# Patient Record
Sex: FEMALE | Race: WHITE | NOT HISPANIC OR LATINO | Employment: PART TIME | URBAN - METROPOLITAN AREA
[De-identification: names, ages, dates, MRNs, and addresses within clinical notes are randomized per-mention and may not be internally consistent; named-entity substitution may affect disease eponyms.]

---

## 2018-12-22 ENCOUNTER — APPOINTMENT (OUTPATIENT)
Dept: RADIOLOGY | Facility: MEDICAL CENTER | Age: 33
End: 2018-12-22
Attending: EMERGENCY MEDICINE
Payer: COMMERCIAL

## 2018-12-22 ENCOUNTER — HOSPITAL ENCOUNTER (EMERGENCY)
Facility: MEDICAL CENTER | Age: 33
End: 2018-12-22
Attending: EMERGENCY MEDICINE
Payer: COMMERCIAL

## 2018-12-22 ENCOUNTER — HOSPITAL ENCOUNTER (OUTPATIENT)
Dept: RADIOLOGY | Facility: MEDICAL CENTER | Age: 33
End: 2018-12-22
Attending: PHYSICIAN ASSISTANT
Payer: COMMERCIAL

## 2018-12-22 ENCOUNTER — OFFICE VISIT (OUTPATIENT)
Dept: URGENT CARE | Facility: PHYSICIAN GROUP | Age: 33
End: 2018-12-22
Payer: COMMERCIAL

## 2018-12-22 VITALS
HEART RATE: 79 BPM | HEIGHT: 66 IN | BODY MASS INDEX: 32.99 KG/M2 | DIASTOLIC BLOOD PRESSURE: 83 MMHG | TEMPERATURE: 97.3 F | OXYGEN SATURATION: 97 % | SYSTOLIC BLOOD PRESSURE: 150 MMHG | RESPIRATION RATE: 20 BRPM | WEIGHT: 205.25 LBS

## 2018-12-22 VITALS
HEIGHT: 66 IN | SYSTOLIC BLOOD PRESSURE: 126 MMHG | WEIGHT: 205 LBS | HEART RATE: 121 BPM | DIASTOLIC BLOOD PRESSURE: 80 MMHG | TEMPERATURE: 99 F | BODY MASS INDEX: 32.95 KG/M2 | OXYGEN SATURATION: 91 %

## 2018-12-22 DIAGNOSIS — R52 BODY ACHES: ICD-10-CM

## 2018-12-22 DIAGNOSIS — R11.0 NAUSEA: ICD-10-CM

## 2018-12-22 DIAGNOSIS — R10.84 GENERALIZED ABDOMINAL PAIN: ICD-10-CM

## 2018-12-22 DIAGNOSIS — R06.02 SOB (SHORTNESS OF BREATH): ICD-10-CM

## 2018-12-22 DIAGNOSIS — R11.2 NAUSEA AND VOMITING, INTRACTABILITY OF VOMITING NOT SPECIFIED, UNSPECIFIED VOMITING TYPE: ICD-10-CM

## 2018-12-22 LAB
ALBUMIN SERPL BCP-MCNC: 4.6 G/DL (ref 3.2–4.9)
ALBUMIN/GLOB SERPL: 1.6 G/DL
ALP SERPL-CCNC: 54 U/L (ref 30–99)
ALT SERPL-CCNC: 15 U/L (ref 2–50)
ANION GAP SERPL CALC-SCNC: 13 MMOL/L (ref 0–11.9)
APPEARANCE UR: CLEAR
AST SERPL-CCNC: 17 U/L (ref 12–45)
BASOPHILS # BLD AUTO: 0.7 % (ref 0–1.8)
BASOPHILS # BLD: 0.06 K/UL (ref 0–0.12)
BILIRUB SERPL-MCNC: 0.5 MG/DL (ref 0.1–1.5)
BILIRUB UR STRIP-MCNC: NEGATIVE MG/DL
BUN SERPL-MCNC: 16 MG/DL (ref 8–22)
CALCIUM SERPL-MCNC: 9.5 MG/DL (ref 8.5–10.5)
CHLORIDE SERPL-SCNC: 109 MMOL/L (ref 96–112)
CO2 SERPL-SCNC: 19 MMOL/L (ref 20–33)
COLOR UR AUTO: YELLOW
CREAT SERPL-MCNC: 1.25 MG/DL (ref 0.5–1.4)
EOSINOPHIL # BLD AUTO: 0.15 K/UL (ref 0–0.51)
EOSINOPHIL NFR BLD: 1.7 % (ref 0–6.9)
ERYTHROCYTE [DISTWIDTH] IN BLOOD BY AUTOMATED COUNT: 44.2 FL (ref 35.9–50)
FLUAV+FLUBV AG SPEC QL IA: NEGATIVE
GLOBULIN SER CALC-MCNC: 2.8 G/DL (ref 1.9–3.5)
GLUCOSE SERPL-MCNC: 105 MG/DL (ref 65–99)
GLUCOSE UR STRIP.AUTO-MCNC: NEGATIVE MG/DL
HCG SERPL QL: NEGATIVE
HCT VFR BLD AUTO: 42.7 % (ref 37–47)
HGB BLD-MCNC: 14.9 G/DL (ref 12–16)
IMM GRANULOCYTES # BLD AUTO: 0.02 K/UL (ref 0–0.11)
IMM GRANULOCYTES NFR BLD AUTO: 0.2 % (ref 0–0.9)
INT CON NEG: NEGATIVE
INT CON NEG: NEGATIVE
INT CON POS: POSITIVE
INT CON POS: POSITIVE
KETONES UR STRIP.AUTO-MCNC: NEGATIVE MG/DL
LACTATE BLD-SCNC: 1.6 MMOL/L (ref 0.5–2)
LEUKOCYTE ESTERASE UR QL STRIP.AUTO: NEGATIVE
LIPASE SERPL-CCNC: 16 U/L (ref 11–82)
LYMPHOCYTES # BLD AUTO: 2.36 K/UL (ref 1–4.8)
LYMPHOCYTES NFR BLD: 26.9 % (ref 22–41)
MCH RBC QN AUTO: 34.3 PG (ref 27–33)
MCHC RBC AUTO-ENTMCNC: 34.9 G/DL (ref 33.6–35)
MCV RBC AUTO: 98.4 FL (ref 81.4–97.8)
MONOCYTES # BLD AUTO: 0.77 K/UL (ref 0–0.85)
MONOCYTES NFR BLD AUTO: 8.8 % (ref 0–13.4)
NEUTROPHILS # BLD AUTO: 5.4 K/UL (ref 2–7.15)
NEUTROPHILS NFR BLD: 61.7 % (ref 44–72)
NITRITE UR QL STRIP.AUTO: NORMAL
NRBC # BLD AUTO: 0 K/UL
NRBC BLD-RTO: 0 /100 WBC
PH UR STRIP.AUTO: 7 [PH] (ref 5–8)
PLATELET # BLD AUTO: 278 K/UL (ref 164–446)
PMV BLD AUTO: 9.4 FL (ref 9–12.9)
POTASSIUM SERPL-SCNC: 3.8 MMOL/L (ref 3.6–5.5)
PROT SERPL-MCNC: 7.4 G/DL (ref 6–8.2)
PROT UR QL STRIP: NEGATIVE MG/DL
RBC # BLD AUTO: 4.34 M/UL (ref 4.2–5.4)
RBC UR QL AUTO: NEGATIVE
S PYO AG THROAT QL: NORMAL
SODIUM SERPL-SCNC: 141 MMOL/L (ref 135–145)
SP GR UR STRIP.AUTO: 1.01
TROPONIN I SERPL-MCNC: <0.01 NG/ML (ref 0–0.04)
UROBILINOGEN UR STRIP-MCNC: NEGATIVE MG/DL
WBC # BLD AUTO: 8.8 K/UL (ref 4.8–10.8)

## 2018-12-22 PROCEDURE — 87804 INFLUENZA ASSAY W/OPTIC: CPT | Performed by: PHYSICIAN ASSISTANT

## 2018-12-22 PROCEDURE — 76705 ECHO EXAM OF ABDOMEN: CPT

## 2018-12-22 PROCEDURE — 83690 ASSAY OF LIPASE: CPT

## 2018-12-22 PROCEDURE — 700102 HCHG RX REV CODE 250 W/ 637 OVERRIDE(OP): Performed by: EMERGENCY MEDICINE

## 2018-12-22 PROCEDURE — 93005 ELECTROCARDIOGRAM TRACING: CPT | Performed by: EMERGENCY MEDICINE

## 2018-12-22 PROCEDURE — 81002 URINALYSIS NONAUTO W/O SCOPE: CPT | Performed by: PHYSICIAN ASSISTANT

## 2018-12-22 PROCEDURE — 99214 OFFICE O/P EST MOD 30 MIN: CPT | Performed by: PHYSICIAN ASSISTANT

## 2018-12-22 PROCEDURE — 84484 ASSAY OF TROPONIN QUANT: CPT

## 2018-12-22 PROCEDURE — 84703 CHORIONIC GONADOTROPIN ASSAY: CPT

## 2018-12-22 PROCEDURE — 87880 STREP A ASSAY W/OPTIC: CPT | Performed by: PHYSICIAN ASSISTANT

## 2018-12-22 PROCEDURE — 87040 BLOOD CULTURE FOR BACTERIA: CPT

## 2018-12-22 PROCEDURE — 700105 HCHG RX REV CODE 258: Performed by: EMERGENCY MEDICINE

## 2018-12-22 PROCEDURE — 74177 CT ABD & PELVIS W/CONTRAST: CPT

## 2018-12-22 PROCEDURE — 36415 COLL VENOUS BLD VENIPUNCTURE: CPT

## 2018-12-22 PROCEDURE — 85025 COMPLETE CBC W/AUTO DIFF WBC: CPT

## 2018-12-22 PROCEDURE — 83605 ASSAY OF LACTIC ACID: CPT

## 2018-12-22 PROCEDURE — A9270 NON-COVERED ITEM OR SERVICE: HCPCS | Performed by: EMERGENCY MEDICINE

## 2018-12-22 PROCEDURE — 80053 COMPREHEN METABOLIC PANEL: CPT

## 2018-12-22 PROCEDURE — 700117 HCHG RX CONTRAST REV CODE 255: Performed by: EMERGENCY MEDICINE

## 2018-12-22 PROCEDURE — 99285 EMERGENCY DEPT VISIT HI MDM: CPT

## 2018-12-22 PROCEDURE — 71046 X-RAY EXAM CHEST 2 VIEWS: CPT

## 2018-12-22 RX ORDER — SULFAMETHOXAZOLE AND TRIMETHOPRIM 800; 160 MG/1; MG/1
1 TABLET ORAL 2 TIMES DAILY
COMMUNITY
Start: 2018-12-21

## 2018-12-22 RX ORDER — SODIUM CHLORIDE 9 MG/ML
1000 INJECTION, SOLUTION INTRAVENOUS ONCE
Status: COMPLETED | OUTPATIENT
Start: 2018-12-22 | End: 2018-12-22

## 2018-12-22 RX ORDER — ONDANSETRON 4 MG/1
4 TABLET, ORALLY DISINTEGRATING ORAL ONCE
Qty: 10 TAB | Refills: 0 | Status: SHIPPED | OUTPATIENT
Start: 2018-12-22 | End: 2018-12-22

## 2018-12-22 RX ORDER — NAPROXEN SODIUM 220 MG
220 TABLET ORAL 2 TIMES DAILY WITH MEALS
COMMUNITY

## 2018-12-22 RX ORDER — ONDANSETRON 4 MG/1
4 TABLET, ORALLY DISINTEGRATING ORAL ONCE
Status: COMPLETED | OUTPATIENT
Start: 2018-12-22 | End: 2018-12-22

## 2018-12-22 RX ADMIN — SODIUM CHLORIDE 1000 ML: 9 INJECTION, SOLUTION INTRAVENOUS at 19:11

## 2018-12-22 RX ADMIN — ONDANSETRON 4 MG: 4 TABLET, ORALLY DISINTEGRATING ORAL at 17:41

## 2018-12-22 RX ADMIN — LIDOCAINE HYDROCHLORIDE 30 ML: 20 SOLUTION OROPHARYNGEAL at 19:09

## 2018-12-22 RX ADMIN — IOHEXOL 100 ML: 350 INJECTION, SOLUTION INTRAVENOUS at 21:14

## 2018-12-22 ASSESSMENT — ENCOUNTER SYMPTOMS
FEVER: 1
BLOOD IN STOOL: 0
EYE REDNESS: 0
NAUSEA: 1
NECK PAIN: 0
SPUTUM PRODUCTION: 0
DIARRHEA: 0
FLANK PAIN: 0
CHILLS: 1
CONSTIPATION: 0
SORE THROAT: 0
EYE DISCHARGE: 0
CLAUDICATION: 0
SHORTNESS OF BREATH: 1
DIZZINESS: 1
DIAPHORESIS: 0
VOMITING: 0
WHEEZING: 0
CHANGE IN BOWEL HABIT: 0
MYALGIAS: 1
ABDOMINAL PAIN: 1
PALPITATIONS: 0
COUGH: 0

## 2018-12-22 ASSESSMENT — PAIN SCALES - GENERAL: PAINLEVEL_OUTOF10: 5

## 2018-12-23 NOTE — ED TRIAGE NOTES
".Magaly Strong  .  Chief Complaint   Patient presents with   • Sent from Urgent Care     for further eval of n/v   • N/V     x 1 week   • Abdominal Pain     x 1 week     Patient to triage above complaint. Patient currently taking antibiotics for UTI. ./83   Pulse (!) 120   Temp 36.3 °C (97.3 °F) (Temporal)   Resp 20   Ht 1.676 m (5' 6\")   Wt 93.1 kg (205 lb 4 oz)   SpO2 95%   BMI 33.13 kg/m²     Patient to lobby and instructed to inform staff of any needs.  "

## 2018-12-23 NOTE — ED PROVIDER NOTES
ED Provider Note    Scribed for Rodriguez Espinal M.D. by Kwame Spencer. 12/22/2018  6:42 PM    Primary care provider: Stacie Verma D.O.  Means of arrival: Walk in   History obtained from: Patient   History limited by: None     CHIEF COMPLAINT  Chief Complaint   Patient presents with   • Sent from Urgent Care     for further eval of n/v   • N/V     x 1 week   • Abdominal Pain     x 1 week       HPI  Magaly Strong is a 33 y.o. female who presents to the Emergency Department abdominal pain, nausea, vomiting. The patient was seen in  and she was sent here for further evaluation of N/V. She states her symptoms have been ongoing for about a week and she states she does have a fever as well. She states she was seen by her primary care two days ago and was given Bactrim as she had inflamed lymph nodes on her groin area and her PCP suspected a bladder infection. She has take two days worth thus far. Her abdominal pain is localized her central abdomen and does not radiate. She states her abdominal pain is usually present after eating and she does not have much of an appetite. She states after lunch today she had worsening symptoms as well as shakiness and dizziness. Additionally she states intermittent nausea and vomiting and states she has had 2-3 episodes of diarrhea in the last two days. Patient states she has a Swapna IUD and so does not get monthly periods. She denies any scraps or scratches, skin changes, vaginal discharge or bleeding, sore throat.     REVIEW OF SYSTEMS  Pertinent positives include: abdominal pain, nausea, vomiting, fever, body aches, shakiness, body aches, nausea, vomiting, diarrhea. Pertinent negatives include: scraps or scratches, skin changes, vaginal discharge or bleeding, sore throat. See history of present illness. All other systems are negative.     PAST MEDICAL HISTORY   has a past medical history of Acne.    SURGICAL HISTORY   has a past surgical history that includes  "tonsillectomy.    SOCIAL HISTORY  Social History   Substance Use Topics   • Smoking status: Never Smoker   • Smokeless tobacco: Never Used   • Alcohol use 1.8 oz/week     3 Glasses of wine per week      Comment: occ      History   Drug Use No       FAMILY HISTORY  History reviewed. No pertinent family history.    CURRENT MEDICATIONS  Home Medications     Reviewed by Gerard Peacock (Pharmacy Tech) on 12/22/18 at 2034  Med List Status: Complete   Medication Last Dose Status   Multiple Vitamins-Minerals (MULTIVITAMIN PO) 12/22/2018 Active   naproxen (ALEVE) 220 MG tablet 12/17/2018 Active   Omega-3 Fatty Acids (FISH OIL PO) 12/22/2018 Active   Probiotic Product (PROBIOTIC PO) 12/22/2018 Active   sulfamethoxazole-trimethoprim (BACTRIM DS) 800-160 MG tablet 12/22/2018 Active   TURMERIC PO 12/22/2018 Active                ALLERGIES  No Known Allergies    PHYSICAL EXAM  VITAL SIGNS: /83   Pulse (!) 120   Temp 36.3 °C (97.3 °F) (Temporal)   Resp 20   Ht 1.676 m (5' 6\")   Wt 93.1 kg (205 lb 4 oz)   SpO2 95%   BMI 33.13 kg/m²     Constitutional: Alert in no apparent distress.  HENT: No signs of trauma, Bilateral external ears normal, Nose normal. Uvula midline.   Eyes: Pupils are equal and reactive, Conjunctiva normal, Non-icteric.   Neck: Normal range of motion, No tenderness, Supple, No stridor.   Lymphatic: No inguinal lymphadenopathy noted. MILAGROS Caruso present for exam.    Cardiovascular: Regular rate and rhythm, no murmurs.   Thorax & Lungs: Normal breath sounds, No respiratory distress, No wheezing, No chest tenderness.   Abdomen: Right upper quadrant tenderness to palpation. Bowel sounds normal, Soft, No peritoneal signs, No masses, No pulsatile masses.   Skin: Warm, Dry, No erythema, No rash.   Back: No bony tenderness, No CVA tenderness.   Extremities: Intact distal pulses, No edema, No tenderness, No cyanosis.  Musculoskeletal: Good range of motion in all major joints. No tenderness to palpation or " "major deformities noted.   Neurologic: Alert , Normal motor function, Normal sensory function, No focal deficits noted.   Psychiatric: Affect normal, Judgment normal, Mood normal.     DIAGNOSTIC STUDIES / PROCEDURES    LABS  Labs Reviewed   CBC WITH DIFFERENTIAL - Abnormal; Notable for the following:        Result Value    MCV 98.4 (*)     MCH 34.3 (*)     All other components within normal limits   COMP METABOLIC PANEL - Abnormal; Notable for the following:     Co2 19 (*)     Anion Gap 13.0 (*)     Glucose 105 (*)     All other components within normal limits   ESTIMATED GFR - Abnormal; Notable for the following:     GFR If Non  49 (*)     All other components within normal limits   LIPASE   TROPONIN   LACTIC ACID   BLOOD CULTURE    Narrative:     Per Hospital Policy: Only change Specimen Src: to \"Line\" if  specified by physician order.   BLOOD CULTURE    Narrative:     Per Hospital Policy: Only change Specimen Src: to \"Line\" if  specified by physician order.   HCG QUAL SERUM      All labs reviewed by me.    EKG  12 Lead EKG interpreted by me to show:  Indication: Arrhythmia   Normal sinus rhythm  Rate 79  Axis: Normal  Intervals: Normal  Normal T waves  Normal ST segments  My impression of this EKG: No STEMI.     RADIOLOGY  CT-ABDOMEN-PELVIS WITH   Final Result         1.  No acute abnormality.   2.  Enhancing right ovarian lesion, most commonly associated with including cyst.      US-RUQ   Final Result         1.  Exam within normal limits.      Examination technically limited due to bowel gas.        The radiologist's interpretation of all radiological studies have been reviewed by me.    COURSE & MEDICAL DECISION MAKING  Nursing notes, VS, PMSFHx reviewed in chart.    33 y.o. female p/w chief complaint of abdominal pain, nausea, vomiting, fever onset one week.    6:42 PM Patient seen and examined at bedside. Ordered US-RUQ, CBC, CMP, Lipase, Troponin, lactic acid, blood culture, UA, HCG qual, " and EKG to evaluate. Patient treated with GI cocktail. Intravenous fluids administered for vomiting and dry mucous membranes. Patient not appropriate for oral rehydration, as surgical process needs to be ruled out before trial of oral rehydration.     9:37 PM - On repeat evaluation, patients symptoms are improved.  Pt w/ positive fluid response. I reviewed the imaging results and labs with the patient which were unremarkable for any etiology which would explain her symptoms. I spoke with the patient about how this could be a virus or influenza and this cannot be treated with antibiotics but should resolve on its own with time. She was encouraged to follow up with her primary care in regard to her symptoms, to stay well hydrated and treat fever with tylenol. She was agreeable with her care and discharge home.     No CT e/o appendicitis or abscess  No LLQ pain or TTP or fever to suggest diverticulitis  No pain at of proportion to suggest mesenteric ischemia  No e/o rash or zoster  No e/o UTI on testing earlier today  No upper abd pain or elevation in lipase to suggest pancreatitis  ECG unremarkable and no chest pain to suggest intrathoracic etiology of abd pain    The patient will return for new or worsening symptoms and is stable at the time of discharge.    DISPOSITION:  Patient will be discharged home in stable condition.    FOLLOW UP:  Stacie Verma D.O.  [address]  [id number]  [place] [address]  [phone]    In 3 days      [facility], Emergency Dept  [address] [address] [phone]    If symptoms worsen      OUTPATIENT MEDICATIONS:  Discharge Medication List as of 12/22/2018 10:23 PM      START taking these medications    Details   ondansetron (ZOFRAN ODT) 4 MG TABLET DISPERSIBLE Take 1 Tab by mouth Once for 1 dose., Disp-10 Tab, R-0, Normal             FINAL IMPRESSION  1. Generalized abdominal pain    2. Nausea and vomiting, intractability of vomiting not  specified, unspecified vomiting type          I, Kwame Spencer (Scribe), am scribing for, and in the presence of, Rodriguez Espinal M.D..    Electronically signed by: Kwame Spencer (Scribnoy), 12/22/2018    IRodriguez M.D. personally performed the services described in this documentation, as scribed by Kwame Spencer in my presence, and it is both accurate and complete. C.     The note accurately reflects work and decisions made by me.  Rodriguez Espinal  12/23/2018  1:39 AM

## 2018-12-23 NOTE — ED NOTES
Received report from Zuly LINARES. Pt resting comfortably in bed, advised to notify staff of any needs, and provide a urine sample when possible.

## 2018-12-23 NOTE — ED NOTES
Med rec complete per pt at bedside  Ok per Pt to discuss medications with visitor/s present  Allergies have been verified       Pt reports that she started a 10 day course of BACTRIM on 12- and has only had 3 doses

## 2018-12-23 NOTE — ED NOTES
PIV established, blood drawn and sent to lab, medicated per MAR, IVF infusing, lab called for two sets of blood cultures, report to MILAGROS Aparicio.

## 2018-12-23 NOTE — PROGRESS NOTES
Subjective:      Magaly Strong is a 33 y.o. female who presents with Nausea (x1day //light headed ) and Medication Reaction (possible reaction to balctrom)            Patient is a 33-year-old female who presents to urgent care with nausea, intermittent dizziness and body aches that worsened today.  Patient reports not feeling very well earlier this week when she saw her PCP 2 days ago and was started on Bactrim for suspected urinary tract infection as patient was having enlarged lymph nodes to her groin.  Patient reports later that day she actually send off a urinalysis and urine culture at her work of which both were negative for infection at that time.  She became concerned as today with her third dose of Bactrim when she started to feel shaky, nauseated with abdominal pressure this afternoon after eating French fries.  She reports off and on she has been having flulike symptoms with intermittent episodes of shortness of breath.  Patient also reports a low-grade fever for the last week.  She denies any urinary symptoms of dysuria, vaginal discharge, lower abdominal pain, cough, congestion or sore throat.  Patient does report abdominal upset after her meal earlier today however denies current abdominal pain.  Patient reports her PCP also miguel labs 2 days ago however since has not heard about results.      Nausea   This is a new problem. The current episode started in the past 7 days. The problem occurs intermittently. The problem has been waxing and waning. Associated symptoms include abdominal pain, chills, a fever, myalgias and nausea. Pertinent negatives include no change in bowel habit, chest pain, congestion, coughing, diaphoresis, neck pain, rash, sore throat or vomiting. Nothing aggravates the symptoms. Treatments tried: Bactrim.       Review of Systems   Constitutional: Positive for chills, fever and malaise/fatigue. Negative for diaphoresis.   HENT: Negative for congestion, ear pain and sore throat.    Eyes:  "Negative for discharge and redness.   Respiratory: Positive for shortness of breath. Negative for cough, sputum production and wheezing.    Cardiovascular: Negative for chest pain, palpitations, claudication and leg swelling.   Gastrointestinal: Positive for abdominal pain and nausea. Negative for blood in stool, change in bowel habit, constipation, diarrhea, melena and vomiting.   Genitourinary: Negative for dysuria, flank pain, frequency, hematuria and urgency.   Musculoskeletal: Positive for myalgias. Negative for joint pain and neck pain.   Skin: Negative for itching and rash.   Neurological: Positive for dizziness.   All other systems reviewed and are negative.         Objective:     /80 (BP Location: Left arm, Patient Position: Sitting)   Pulse (!) 121   Temp 37.2 °C (99 °F) (Temporal)   Ht 1.676 m (5' 6\")   Wt 93 kg (205 lb)   SpO2 91%   BMI 33.09 kg/m²    PMH:  has a past medical history of Acne.  MEDS:   Current Outpatient Prescriptions:   •  naproxen (NAPROSYN) 500 MG TABS, Take 1 Tab by mouth 2 times a day, with meals., Disp: 60 Tab, Rfl: 3  •  spironolactone (ALDACTONE) 25 MG TABS, Take 25 mg by mouth every day., Disp: , Rfl:   •  cyclobenzaprine (FLEXERIL) 10 MG TABS, Take 1 Tab by mouth 3 times a day as needed for Mild Pain. (Patient not taking: Reported on 12/22/2018), Disp: 30 Tab, Rfl: 0  •  hydrocodone-acetaminophen (NORCO) 5-325 MG TABS per tablet, Take 1-2 Tabs by mouth every 6 hours as needed. (Patient not taking: Reported on 12/22/2018), Disp: 20 Tab, Rfl: 0  ALLERGIES: No Known Allergies  SURGHX: No past surgical history on file.  SOCHX:  reports that she has never smoked. She does not have any smokeless tobacco history on file. She reports that she drinks about 1.8 oz of alcohol per week . She reports that she does not use drugs.  FH: Family history was reviewed, no pertinent findings to report    Physical Exam   Constitutional: She is oriented to person, place, and time. She " appears well-developed and well-nourished. No distress.   HENT:   Head: Normocephalic and atraumatic.   Right Ear: External ear normal.   Left Ear: External ear normal.   Mouth/Throat: Oropharynx is clear and moist. No oropharyngeal exudate.   Eyes: Pupils are equal, round, and reactive to light. Conjunctivae and EOM are normal.   Neck: Normal range of motion. Neck supple. No tracheal deviation present.   Cardiovascular: Normal rate and regular rhythm.    No murmur heard.  Pulmonary/Chest: Effort normal and breath sounds normal. No respiratory distress.   Abdominal: Soft. Bowel sounds are normal. She exhibits no distension. There is tenderness in the left upper quadrant. There is no guarding.       Noted palpable inguinal adenopathy without significant tenderness or enlargement.   minimal left upper quadrant abdominal tenderness without rebound, rigidity or guarding.  Negative heel tap.   Musculoskeletal: Normal range of motion. She exhibits no edema.   Neurological: She is alert and oriented to person, place, and time. Coordination normal.   Skin: Skin is warm. No rash noted.   Psychiatric: She has a normal mood and affect. Her behavior is normal. Judgment and thought content normal.   Vitals reviewed.              Assessment/Plan:     1. SOB (shortness of breath)  2. Nausea  - POCT Urinalysis  - POCT Rapid Strep A  - POCT Influenza A/B  - DX-CHEST-2 VIEWS; Future  - ondansetron (ZOFRAN ODT) dispertab 4 mg; Take 1 Tab by mouth Once.    3. Body aches  - POCT Urinalysis  - POCT Rapid Strep A  - POCT Influenza A/B  - DX-CHEST-2 VIEWS; Future    Strep, flu, urinalysis all within normal limits.  Chest x-ray also is without evidence of consolidation or cardiopulmonary abnormality.  Patient was given Zofran in the clinic as she became notably nauseated.  At this time I am uncertain source of patient's notable symptoms however she is with noted tachycardia, low oxygen saturation and has been with low-grade fevers off and  on last few days.  Patient without acute abdomen however patient has been having intermittent abdominal pressure, mild tenderness over the left upper quadrant and at times cramping-could be abdominal etiology.  Patient lacks prior abdominal surgical history.  Patient was also without recent travel, calf or leg pain, stasis and she is a non-smoker.  Patient does utilize an IUD-PE is unlikely as she is currently without chest pain although vitals and intermittent episodes of shortness of breath may need to consider differential.  At this time I do feel that patient needs further evaluation and timely workup and unfortunately at this time further labs and imaging are no longer available.  Patient is agreeable to go to the emergency room for further evaluation-she is uncertain which ER she will present to although is agreeable to go this evening.  Patient left in stable condition with her boyfriend driving her.

## 2018-12-27 LAB
BACTERIA BLD CULT: NORMAL
BACTERIA BLD CULT: NORMAL
SIGNIFICANT IND 70042: NORMAL
SIGNIFICANT IND 70042: NORMAL
SITE SITE: NORMAL
SITE SITE: NORMAL
SOURCE SOURCE: NORMAL
SOURCE SOURCE: NORMAL

## 2018-12-28 LAB — EKG IMPRESSION: NORMAL

## 2019-07-10 ENCOUNTER — HOSPITAL ENCOUNTER (OUTPATIENT)
Dept: LAB | Facility: MEDICAL CENTER | Age: 34
End: 2019-07-10
Attending: OBSTETRICS & GYNECOLOGY
Payer: COMMERCIAL

## 2019-07-10 PROCEDURE — 87086 URINE CULTURE/COLONY COUNT: CPT

## 2019-07-10 PROCEDURE — 81001 URINALYSIS AUTO W/SCOPE: CPT

## 2019-07-11 LAB
AMORPH CRY #/AREA URNS HPF: PRESENT /HPF
APPEARANCE UR: ABNORMAL
BACTERIA #/AREA URNS HPF: ABNORMAL /HPF
BILIRUB UR QL STRIP.AUTO: NEGATIVE
COLOR UR: YELLOW
EPI CELLS #/AREA URNS HPF: ABNORMAL /HPF
GLUCOSE UR STRIP.AUTO-MCNC: NEGATIVE MG/DL
KETONES UR STRIP.AUTO-MCNC: 15 MG/DL
LEUKOCYTE ESTERASE UR QL STRIP.AUTO: ABNORMAL
MICRO URNS: ABNORMAL
MUCOUS THREADS #/AREA URNS HPF: ABNORMAL /HPF
NITRITE UR QL STRIP.AUTO: NEGATIVE
PH UR STRIP.AUTO: 6 [PH]
PROT UR QL STRIP: NEGATIVE MG/DL
RBC UR QL AUTO: NEGATIVE
SP GR UR STRIP.AUTO: 1.01
UROBILINOGEN UR STRIP.AUTO-MCNC: 0.2 MG/DL
WBC #/AREA URNS HPF: ABNORMAL /HPF

## 2019-07-13 LAB
BACTERIA UR CULT: NORMAL
SIGNIFICANT IND 70042: NORMAL
SITE SITE: NORMAL
SOURCE SOURCE: NORMAL